# Patient Record
Sex: MALE | Race: WHITE | URBAN - NONMETROPOLITAN AREA
[De-identification: names, ages, dates, MRNs, and addresses within clinical notes are randomized per-mention and may not be internally consistent; named-entity substitution may affect disease eponyms.]

---

## 2022-12-06 ENCOUNTER — OFFICE VISIT (OUTPATIENT)
Dept: URBAN - NONMETROPOLITAN AREA CLINIC 13 | Facility: CLINIC | Age: 62
End: 2022-12-06
Payer: COMMERCIAL

## 2022-12-06 DIAGNOSIS — H04.123 DRY EYE SYNDROME OF BILATERAL LACRIMAL GLANDS: ICD-10-CM

## 2022-12-06 DIAGNOSIS — H25.13 AGE-RELATED NUCLEAR CATARACT, BILATERAL: Primary | ICD-10-CM

## 2022-12-06 DIAGNOSIS — H52.4 PRESBYOPIA: ICD-10-CM

## 2022-12-06 DIAGNOSIS — H53.012 DEPRIVATION AMBLYOPIA, LEFT EYE: ICD-10-CM

## 2022-12-06 PROCEDURE — 99204 OFFICE O/P NEW MOD 45 MIN: CPT | Performed by: OPTOMETRIST

## 2022-12-06 ASSESSMENT — INTRAOCULAR PRESSURE
OD: 16
OS: 14

## 2022-12-06 ASSESSMENT — VISUAL ACUITY
OS: 20/25
OD: 20/20

## 2022-12-06 ASSESSMENT — KERATOMETRY
OS: 45.50
OD: 45.13

## 2022-12-06 NOTE — IMPRESSION/PLAN
Impression: Presbyopia: H52.4. Plan: Recommend glasses for best vision. A copy of the new rx was given to the patient. Recommend glasses for best vision. A copy of the new rx was given to the patient.

## 2022-12-06 NOTE — IMPRESSION/PLAN
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  Plan: Still no treatment indicated for now, observe